# Patient Record
Sex: MALE | Race: WHITE | URBAN - METROPOLITAN AREA
[De-identification: names, ages, dates, MRNs, and addresses within clinical notes are randomized per-mention and may not be internally consistent; named-entity substitution may affect disease eponyms.]

---

## 2020-09-11 ENCOUNTER — OFFICE VISIT (OUTPATIENT)
Dept: URBAN - METROPOLITAN AREA CLINIC 48 | Facility: CLINIC | Age: 30
End: 2020-09-11
Payer: COMMERCIAL

## 2020-09-11 DIAGNOSIS — H53.19 OTHER SUBJECTIVE VISUAL DISTURBANCES: Primary | ICD-10-CM

## 2020-09-11 PROCEDURE — 92004 COMPRE OPH EXAM NEW PT 1/>: CPT | Performed by: OPTOMETRIST

## 2020-09-11 ASSESSMENT — INTRAOCULAR PRESSURE
OS: 13
OD: 16

## 2020-09-11 NOTE — IMPRESSION/PLAN
Impression: Other subjective visual disturbances: H53.19.
likely ocular migraine vs floaters, no retinal pathology noted  Plan: Discussed with patient. RTC 4-6 weeks DFE, if symptoms persist or worsen in 1-2 weeks patient to schedule appointment with Dr. Ming Carroll Patient to make an appointment with PCP.

## 2021-03-29 ENCOUNTER — OFFICE VISIT (OUTPATIENT)
Dept: URBAN - METROPOLITAN AREA CLINIC 48 | Facility: CLINIC | Age: 31
End: 2021-03-29
Payer: COMMERCIAL

## 2021-03-29 DIAGNOSIS — H43.813 VITREOUS DEGENERATION, BILATERAL: Primary | ICD-10-CM

## 2021-03-29 PROCEDURE — 92134 CPTRZ OPH DX IMG PST SGM RTA: CPT | Performed by: STUDENT IN AN ORGANIZED HEALTH CARE EDUCATION/TRAINING PROGRAM

## 2021-03-29 PROCEDURE — 92004 COMPRE OPH EXAM NEW PT 1/>: CPT | Performed by: STUDENT IN AN ORGANIZED HEALTH CARE EDUCATION/TRAINING PROGRAM

## 2021-03-29 ASSESSMENT — INTRAOCULAR PRESSURE
OS: 17
OD: 16

## 2021-03-29 NOTE — IMPRESSION/PLAN
Impression: Vitreous degeneration, bilateral: H43.813. No RT/RD seen on today's exam. 
OCT MAC findings: Normal OU. Plan: Explained dx in detail. Reviewed RD/RT precautions with patient. Patient aware to call if any symptoms develop.  

RTC 1 year for [facility] (long exam)